# Patient Record
Sex: MALE | Race: OTHER | Employment: UNEMPLOYED | URBAN - METROPOLITAN AREA
[De-identification: names, ages, dates, MRNs, and addresses within clinical notes are randomized per-mention and may not be internally consistent; named-entity substitution may affect disease eponyms.]

---
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
## 2022-10-03 NOTE — ANESTHESIA POSTPROCEDURE EVALUATION
Post-Anesthesia Evaluation and Assessment    Patient: Kayla Knapp MRN: [id number]  SSN: [id number]    YOB: 2000  Age: 16 y.o. Sex: male       Cardiovascular Function/Vital Signs  Visit Vitals    /73    Pulse 84    Temp 36.4 °C (97.6 °F)    Resp 12    Ht 177.8 cm    Wt 70.6 kg    SpO2 100%    BMI 22.32 kg/m2       Patient is status post general anesthesia for Procedure(s):  [address] Nausea/Vomiting: None    Postoperative hydration reviewed and adequate. Pain:  Pain Scale 1: Numeric (0 - 10) (07/03/18 1245)  Pain Intensity 1: 0 (07/03/18 1245)   Managed    Neurological Status:   Neuro (WDL): Within Defined Limits (07/03/18 1200)  Neuro  Neurologic State: Alert (07/03/18 1245)  Orientation Level: Oriented to person;Oriented to place;Oriented to situation (07/03/18 1200)  LUE Motor Response: Purposeful (07/03/18 1200)  LLE Motor Response: Purposeful (07/03/18 1200)  RUE Motor Response: Purposeful (07/03/18 1200)  RLE Motor Response: Purposeful (07/03/18 1200)   At baseline    Mental Status and Level of Consciousness: Arousable    Pulmonary Status:   O2 Device: Room air (07/03/18 1245)   Adequate oxygenation and airway patent    Complications related to anesthesia: None    Post-anesthesia assessment completed.  No concerns    Signed By: Skyla Rodas MD     July 3, 2018 Detail Level: Simple

## (undated) DEVICE — 1200 GUARD II KIT W/5MM TUBE W/O VAC TUBE: Brand: GUARDIAN

## (undated) DEVICE — SYR 10ML CTRL LR LCK NSAF LF --

## (undated) DEVICE — CODMAN® SURGICAL PATTIES 1/2" X 3" (1.27CM X 7.62CM): Brand: CODMAN®

## (undated) DEVICE — INFECTION CONTROL KIT SYS

## (undated) DEVICE — SURGICAL PROCEDURE PACK BASIN MAJ SET CUST NO CAUT

## (undated) DEVICE — SOLUTION IV 1000ML 0.9% SOD CHL

## (undated) DEVICE — SUTURE CHROMIC GUT SZ 4-0 L18IN ABSRB BRN L13MM P-3 3/8 CIR 1654G

## (undated) DEVICE — PACK,EENT,TURBAN DRAPE,PK II: Brand: MEDLINE

## (undated) DEVICE — REFLEX ULTRA 45 WITH INTEGRATED CABLE: Brand: COBLATION

## (undated) DEVICE — STERILE POLYISOPRENE POWDER-FREE SURGICAL GLOVES: Brand: PROTEXIS

## (undated) DEVICE — SUTURE ABSORBABLE MONOFILAMENT 4-0 SC-1 18 IN PLN GUT 1824H